# Patient Record
Sex: MALE | Race: BLACK OR AFRICAN AMERICAN | NOT HISPANIC OR LATINO | Employment: UNEMPLOYED | URBAN - METROPOLITAN AREA
[De-identification: names, ages, dates, MRNs, and addresses within clinical notes are randomized per-mention and may not be internally consistent; named-entity substitution may affect disease eponyms.]

---

## 2020-09-18 ENCOUNTER — OUTSIDE PLACE OF SERVICE (OUTPATIENT)
Dept: CARDIOLOGY | Facility: CLINIC | Age: 40
End: 2020-09-18
Payer: MEDICAID

## 2020-09-18 PROCEDURE — 93010 PR ELECTROCARDIOGRAM REPORT: ICD-10-PCS | Mod: ,,, | Performed by: INTERNAL MEDICINE

## 2020-09-18 PROCEDURE — 93010 ELECTROCARDIOGRAM REPORT: CPT | Mod: ,,, | Performed by: INTERNAL MEDICINE

## 2022-08-23 ENCOUNTER — HOSPITAL ENCOUNTER (EMERGENCY)
Facility: HOSPITAL | Age: 42
Discharge: HOME | End: 2022-08-23
Attending: EMERGENCY MEDICINE
Payer: MEDICAID

## 2022-08-23 VITALS
BODY MASS INDEX: 42.04 KG/M2 | DIASTOLIC BLOOD PRESSURE: 68 MMHG | OXYGEN SATURATION: 99 % | SYSTOLIC BLOOD PRESSURE: 124 MMHG | RESPIRATION RATE: 18 BRPM | WEIGHT: 310 LBS | TEMPERATURE: 98 F | HEART RATE: 65 BPM

## 2022-08-23 DIAGNOSIS — M54.9 BACK PAIN, UNSPECIFIED BACK LOCATION, UNSPECIFIED BACK PAIN LATERALITY, UNSPECIFIED CHRONICITY: Primary | ICD-10-CM

## 2022-08-23 DIAGNOSIS — K76.0 HEPATIC STEATOSIS: ICD-10-CM

## 2022-08-23 DIAGNOSIS — I86.1 BILATERAL VARICOCELES: ICD-10-CM

## 2022-08-23 DIAGNOSIS — N50.819 TESTICLE PAIN: ICD-10-CM

## 2022-08-23 DIAGNOSIS — R73.9 HYPERGLYCEMIA: ICD-10-CM

## 2022-08-23 DIAGNOSIS — N50.3 EPIDIDYMAL CYST: ICD-10-CM

## 2022-08-23 LAB
ALBUMIN SERPL BCP-MCNC: 4.5 G/DL (ref 3.5–5.2)
ALP SERPL-CCNC: 44 U/L (ref 55–135)
ALT SERPL W/O P-5'-P-CCNC: 46 U/L (ref 10–44)
ANION GAP SERPL CALC-SCNC: 9 MMOL/L (ref 8–16)
AST SERPL-CCNC: 25 U/L (ref 10–40)
BACTERIA #/AREA URNS AUTO: NORMAL /HPF
BASOPHILS # BLD AUTO: 0.02 K/UL (ref 0–0.2)
BASOPHILS NFR BLD: 0.3 % (ref 0–1.9)
BILIRUB SERPL-MCNC: 0.6 MG/DL (ref 0.1–1)
BILIRUB UR QL STRIP: NEGATIVE
BUN SERPL-MCNC: 9 MG/DL (ref 6–20)
CALCIUM SERPL-MCNC: 9.5 MG/DL (ref 8.7–10.5)
CHLORIDE SERPL-SCNC: 103 MMOL/L (ref 95–110)
CLARITY UR REFRACT.AUTO: CLEAR
CO2 SERPL-SCNC: 24 MMOL/L (ref 23–29)
COLOR UR AUTO: YELLOW
CREAT SERPL-MCNC: 0.8 MG/DL (ref 0.5–1.4)
DIFFERENTIAL METHOD: NORMAL
EOSINOPHIL # BLD AUTO: 0.1 K/UL (ref 0–0.5)
EOSINOPHIL NFR BLD: 1.2 % (ref 0–8)
ERYTHROCYTE [DISTWIDTH] IN BLOOD BY AUTOMATED COUNT: 12.6 % (ref 11.5–14.5)
EST. GFR  (NO RACE VARIABLE): >60 ML/MIN/1.73 M^2
ESTIMATED AVG GLUCOSE: 229 MG/DL (ref 68–131)
GLUCOSE SERPL-MCNC: 311 MG/DL (ref 70–110)
GLUCOSE UR QL STRIP: ABNORMAL
HBA1C MFR BLD: 9.6 % (ref 4–5.6)
HCT VFR BLD AUTO: 43.3 % (ref 40–54)
HGB BLD-MCNC: 14.9 G/DL (ref 14–18)
HGB UR QL STRIP: NEGATIVE
IMM GRANULOCYTES # BLD AUTO: 0.02 K/UL (ref 0–0.04)
IMM GRANULOCYTES NFR BLD AUTO: 0.3 % (ref 0–0.5)
KETONES UR QL STRIP: NEGATIVE
LEUKOCYTE ESTERASE UR QL STRIP: NEGATIVE
LIPASE SERPL-CCNC: 30 U/L (ref 4–60)
LYMPHOCYTES # BLD AUTO: 1.6 K/UL (ref 1–4.8)
LYMPHOCYTES NFR BLD: 28 % (ref 18–48)
MAGNESIUM SERPL-MCNC: 1.5 MG/DL (ref 1.6–2.6)
MCH RBC QN AUTO: 28.5 PG (ref 27–31)
MCHC RBC AUTO-ENTMCNC: 34.4 G/DL (ref 32–36)
MCV RBC AUTO: 83 FL (ref 82–98)
MICROSCOPIC COMMENT: NORMAL
MONOCYTES # BLD AUTO: 0.4 K/UL (ref 0.3–1)
MONOCYTES NFR BLD: 6.1 % (ref 4–15)
NEUTROPHILS # BLD AUTO: 3.7 K/UL (ref 1.8–7.7)
NEUTROPHILS NFR BLD: 64.1 % (ref 38–73)
NITRITE UR QL STRIP: NEGATIVE
NRBC BLD-RTO: 0 /100 WBC
PH UR STRIP: 7 [PH] (ref 5–8)
PLATELET # BLD AUTO: 208 K/UL (ref 150–450)
PMV BLD AUTO: 11 FL (ref 9.2–12.9)
POTASSIUM SERPL-SCNC: 4.2 MMOL/L (ref 3.5–5.1)
PROT SERPL-MCNC: 7.6 G/DL (ref 6–8.4)
PROT UR QL STRIP: ABNORMAL
RBC # BLD AUTO: 5.23 M/UL (ref 4.6–6.2)
RBC #/AREA URNS AUTO: 2 /HPF (ref 0–4)
SODIUM SERPL-SCNC: 136 MMOL/L (ref 136–145)
SP GR UR STRIP: 1.03 (ref 1–1.03)
URN SPEC COLLECT METH UR: ABNORMAL
WBC # BLD AUTO: 5.75 K/UL (ref 3.9–12.7)
WBC #/AREA URNS AUTO: 0 /HPF (ref 0–5)
YEAST UR QL AUTO: NORMAL

## 2022-08-23 PROCEDURE — 99284 PR EMERGENCY DEPT VISIT,LEVEL IV: ICD-10-PCS | Mod: ,,, | Performed by: PHYSICIAN ASSISTANT

## 2022-08-23 PROCEDURE — 83036 HEMOGLOBIN GLYCOSYLATED A1C: CPT | Performed by: PHYSICIAN ASSISTANT

## 2022-08-23 PROCEDURE — 25500020 PHARM REV CODE 255: Performed by: EMERGENCY MEDICINE

## 2022-08-23 PROCEDURE — 87491 CHLMYD TRACH DNA AMP PROBE: CPT | Performed by: PHYSICIAN ASSISTANT

## 2022-08-23 PROCEDURE — 99284 EMERGENCY DEPT VISIT MOD MDM: CPT | Mod: ,,, | Performed by: PHYSICIAN ASSISTANT

## 2022-08-23 PROCEDURE — 87389 HIV-1 AG W/HIV-1&-2 AB AG IA: CPT | Performed by: PHYSICIAN ASSISTANT

## 2022-08-23 PROCEDURE — 80053 COMPREHEN METABOLIC PANEL: CPT | Performed by: PHYSICIAN ASSISTANT

## 2022-08-23 PROCEDURE — 25000003 PHARM REV CODE 250: Performed by: EMERGENCY MEDICINE

## 2022-08-23 PROCEDURE — 63600175 PHARM REV CODE 636 W HCPCS: Performed by: PHYSICIAN ASSISTANT

## 2022-08-23 PROCEDURE — 87591 N.GONORRHOEAE DNA AMP PROB: CPT | Performed by: PHYSICIAN ASSISTANT

## 2022-08-23 PROCEDURE — 83690 ASSAY OF LIPASE: CPT | Performed by: PHYSICIAN ASSISTANT

## 2022-08-23 PROCEDURE — 81001 URINALYSIS AUTO W/SCOPE: CPT | Performed by: EMERGENCY MEDICINE

## 2022-08-23 PROCEDURE — 99285 EMERGENCY DEPT VISIT HI MDM: CPT | Mod: 25

## 2022-08-23 PROCEDURE — 86803 HEPATITIS C AB TEST: CPT | Performed by: PHYSICIAN ASSISTANT

## 2022-08-23 PROCEDURE — 83735 ASSAY OF MAGNESIUM: CPT | Performed by: PHYSICIAN ASSISTANT

## 2022-08-23 PROCEDURE — 85025 COMPLETE CBC W/AUTO DIFF WBC: CPT | Performed by: PHYSICIAN ASSISTANT

## 2022-08-23 PROCEDURE — 96374 THER/PROPH/DIAG INJ IV PUSH: CPT | Mod: 59

## 2022-08-23 RX ORDER — ACETAMINOPHEN 500 MG
1000 TABLET ORAL
Status: COMPLETED | OUTPATIENT
Start: 2022-08-23 | End: 2022-08-23

## 2022-08-23 RX ORDER — KETOROLAC TROMETHAMINE 30 MG/ML
10 INJECTION, SOLUTION INTRAMUSCULAR; INTRAVENOUS
Status: COMPLETED | OUTPATIENT
Start: 2022-08-23 | End: 2022-08-23

## 2022-08-23 RX ORDER — LIDOCAINE 50 MG/G
1 PATCH TOPICAL DAILY
Qty: 15 PATCH | Refills: 0 | Status: SHIPPED | OUTPATIENT
Start: 2022-08-23

## 2022-08-23 RX ADMIN — IOHEXOL 100 ML: 350 INJECTION, SOLUTION INTRAVENOUS at 01:08

## 2022-08-23 RX ADMIN — ACETAMINOPHEN 1000 MG: 500 TABLET ORAL at 01:08

## 2022-08-23 RX ADMIN — KETOROLAC TROMETHAMINE 10 MG: 30 INJECTION, SOLUTION INTRAMUSCULAR; INTRAVENOUS at 01:08

## 2022-08-23 NOTE — FIRST PROVIDER EVALUATION
Medical screening exam completed.  I have conducted a focused provider triage encounter, findings are as follows:    Brief history of present illness:  Patient complaining of back, lower abdominal and right groin pain radiating into his back for 2 years, no acute change today, and denies any urinary symptoms, no fevers or chills    Vitals:    08/23/22 1136   BP: (!) 144/93   BP Location: Right arm   Patient Position: Sitting   Pulse: 87   Resp: 18   Temp: 97.6 °F (36.4 °C)   TempSrc: Oral   SpO2: 96%   Weight: (!) 140.6 kg (310 lb)       Pertinent physical exam:  Vital signs stable and patient well-appearing clinically    Brief workup plan:  UA and tylenol ordered    Preliminary workup initiated; this workup will be continued and followed by the physician or advanced practice provider that is assigned to the patient when roomed.

## 2022-08-23 NOTE — ED TRIAGE NOTES
Pt reports bilateral groin pain x 3 years intermittent in nature that radiates to the back/side. Hx of back pain. Denies urinary symptoms.

## 2022-08-23 NOTE — ED PROVIDER NOTES
Encounter Date: 8/23/2022       History     Chief Complaint   Patient presents with    Groin Pain     X3 years. Reports hasn't been coming to the hospital because hes scared of Monkey pox. Denies urinary symptoms.      The history is provided by the patient and medical records. No  was used.      Angelia Rico is a 41 y.o. male with medical history of obesity, DM, L4-5 disc herniation presenting to the ED with the chief complaint of groin pain.    Patient reports several years of BL flank pain that radiates down to his groin and testicles. Pain worsens with standing up. His groin pain is located along his belt line. Denies recent falls or trauma. Denies scrotal swelling, rash, STD concerns. Reports only being able to get an erection whenever he lays on his stomach or when he lays in a hot bath. Reports being seen in the ED and his old PCP and had CT scans and U/S performed and no one has told him what was wrong. Denies history of hernia, nephrolithiasis, history of abdominal surgeries. Denies penile discharge or concerns for STD. He is sexually active with 1 female partner. No fever, chest pain, SOB, abdominal pain, vomiting, diarrhea, constipation, b/b dysfunction. Last BM was yesterday and normal. 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     Review of patient's allergies indicates:  No Known Allergies  History reviewed. No pertinent past medical history.  Past Surgical History:   Procedure Laterality Date    KNEE SURGERY Right     ACL     History reviewed. No pertinent family history.  Social History     Tobacco Use    Smoking status: Never Smoker   Substance Use Topics    Alcohol use: Yes     Comment: on occassion    Drug use: No     Review of Systems   Constitutional: Negative for fever.   HENT: Negative for sore throat.    Eyes: Negative for pain.   Respiratory: Negative for shortness of breath.    Cardiovascular: Negative for chest pain.    Gastrointestinal: Negative for nausea.   Genitourinary: Positive for flank pain and testicular pain. Negative for dysuria.   Musculoskeletal: Negative for back pain.   Skin: Negative for rash.   Neurological: Negative for weakness.   Hematological: Does not bruise/bleed easily.       Physical Exam     Initial Vitals [08/23/22 1136]   BP Pulse Resp Temp SpO2   (!) 144/93 87 18 97.6 °F (36.4 °C) 96 %      MAP       --         Physical Exam    Constitutional: He appears well-developed and well-nourished. He is not diaphoretic. He is easily aroused.   HENT:   Head: Normocephalic and atraumatic.   Mouth/Throat: Oropharynx is clear and moist. No oropharyngeal exudate.   Eyes: EOM and lids are normal. Pupils are equal, round, and reactive to light. No scleral icterus.   Neck: Phonation normal. Neck supple. No stridor present.   Normal range of motion.  Cardiovascular: Normal rate and regular rhythm.   Pulmonary/Chest: Breath sounds normal. No stridor. No respiratory distress. He has no wheezes. He has no rales.   Abdominal: Abdomen is soft. He exhibits no distension. There is abdominal tenderness (RLQ, LLQ). Hernia confirmed negative in the right inguinal area and confirmed negative in the left inguinal area. There is no rebound.   Genitourinary:    Penis normal.   Left testis shows swelling and tenderness. Circumcised.   Musculoskeletal:         General: No tenderness or edema. Normal range of motion.      Cervical back: Normal range of motion and neck supple.      Comments: No midline spinal tenderness  Full A/P ROM of extremities     Neurological: He is alert, oriented to person, place, and time and easily aroused. He has normal strength. No sensory deficit.   No focal strength or sensory deficit to lower extremities. No saddle anesthesia.   Skin: Skin is warm and dry. No rash noted. No erythema.   Psychiatric: He has a normal mood and affect. His speech is normal.         ED Course   Procedures  Labs Reviewed    URINALYSIS, REFLEX TO URINE CULTURE - Abnormal; Notable for the following components:       Result Value    Protein, UA Trace (*)     Glucose, UA 4+ (*)     All other components within normal limits    Narrative:     Specimen Source->Urine   COMPREHENSIVE METABOLIC PANEL - Abnormal; Notable for the following components:    Glucose 311 (*)     Alkaline Phosphatase 44 (*)     ALT 46 (*)     All other components within normal limits   MAGNESIUM - Abnormal; Notable for the following components:    Magnesium 1.5 (*)     All other components within normal limits   HEMOGLOBIN A1C - Abnormal; Notable for the following components:    Hemoglobin A1C 9.6 (*)     Estimated Avg Glucose 229 (*)     All other components within normal limits    Narrative:     ADD ON [facility] #[id number] PER SAMINA MUÑOZ PA-C 08/23/2022  15:27    C. TRACHOMATIS/N. GONORRHOEAE BY AMP DNA   URINALYSIS MICROSCOPIC    Narrative:     Specimen Source->Urine   CBC W/ AUTO DIFFERENTIAL   LIPASE   HEMOGLOBIN A1C   HIV 1 / 2 ANTIBODY   HEPATITIS C ANTIBODY          Imaging Results          US Scrotum And Testicles (Final result)  Result time 08/23/22 14:22:30    Final result by Rosales Nichols MD (08/23/22 14:22:30)                 Impression:      1. No acute sonographic abnormality.  2. Bilateral varicoceles, left more prominent than right.  3. Tiny left epididymal head cyst.    Electronically signed by resident: Bhupendra Monzon  Date:    08/23/2022  Time:    14:14    Electronically signed by: Rosales Nichols MD  Date:    08/23/2022  Time:    14:22             Narrative:    EXAMINATION:  US SCROTUM AND TESTICLES    CLINICAL HISTORY:  Testicular pain, unspecified    TECHNIQUE:  Sonography of the scrotum and testes.    COMPARISON:  CT abdomen pelvis 10/26/2016.    FINDINGS:  The right testicle measures 5.1 x 2.5 x 3.1 cm, appears homogeneous, and demonstrates intact vascular flow.  The right testicle is unremarkable.  The right epididymis is  unremarkable.    Right-sided varicocele.  No evidence of a right hydrocele.    The left testicle measures 4.7 x 2.4 x 3.0 cm, appears homogeneous, and demonstrates intact vascular flow. The left testicle is unremarkable. The left epididymal head has an anechoic focus measuring 0.3 x 0.2 x 0.2 cm compatible with a cyst.  The left epididymis is otherwise unremarkable.    Left-sided varicocele.  No evidence of a left hydrocele.                               CT Abdomen Pelvis With Contrast (Final result)  Result time 08/23/22 14:47:11    Final result by Franc Giordano MD (08/23/22 14:47:11)                 Impression:      No acute process or CT findings identified to explain patient's symptoms of nonlocalized abdominal pain.  Specifically, no evidence of bowel obstruction.    Hepatomegaly and suspected hepatic steatosis.    Few additional findings as above.      Electronically signed by: Franc Giordano MD  Date:    08/23/2022  Time:    14:47             Narrative:    EXAMINATION:  CT ABDOMEN PELVIS WITH CONTRAST    CLINICAL HISTORY:  Bowel obstruction suspected;Abdominal pain, acute, nonlocalized;    TECHNIQUE:  Low dose axial images, sagittal and coronal reformations were obtained from the lung bases to the pubic symphysis following the IV administration of 100 mL of Omnipaque 350 .  Oral contrast was not given.    COMPARISON:  Scrotal ultrasound same day, CT abdomen and pelvis 10/26/2016    FINDINGS:  Study is somewhat limited by respiratory motion artifact.    Imaged lung bases are grossly clear allowing for respiratory motion.  Base of the heart is within normal limits.    Liver measures approximately 21 cm in maximum coronal length with diffuse parenchymal hypoattenuation and some sparing near the gallbladder suggesting fatty infiltration.  No discrete hepatic lesion seen.  Main portal vein appears patent.    Gallbladder, pancreas, spleen, stomach, duodenum and bilateral adrenal glands are within normal limits.  No  biliary ductal dilatation.    Bilateral kidneys are normal in size, shape and location with relatively symmetric normal enhancement.  No hydronephrosis or significant perinephric stranding.  Ureters are nondilated.  Urinary bladder is well distended without wall thickening.  Prostate and seminal vesicles are within normal limits.  Pelvic phleboliths noted.    Appendix and terminal ileum are within normal limits.  No evidence of bowel obstruction or acute inflammation.  No pneumatosis or portal venous gas.    No ascites, free air or lymphadenopathy.  No significant atherosclerosis.  No aortic aneurysm or dissection.    Small fat containing umbilical hernia.    Osseous structures show moderate to advanced degenerative disc disease now with vacuum phenomenon at L4-5 level, progressed since 2016.  Minimal degenerative change elsewhere.  No acute displaced fracture-dislocation.                                 Medications   acetaminophen tablet 1,000 mg (1,000 mg Oral Given 8/23/22 1301)   ketorolac injection 9.999 mg (9.999 mg Intravenous Given 8/23/22 1301)   iohexoL (OMNIPAQUE 350) injection 100 mL (100 mLs Intravenous Given 8/23/22 1342)     Medical Decision Making:   History:   Old Medical Records: I decided to obtain old medical records.  Old Records Summarized: records from clinic visits.  Clinical Tests:   Lab Tests: Ordered and Reviewed  Radiological Study: Ordered and Reviewed       APC / Resident Notes:   41 y.o. male with medical history of obesity, DM, L4-5 disc herniation presenting to the ED c/o acute on chronic BL flank pain that radiates down to his groin and testicles. DDx includes but not limited to lumbar radiculopathy, nephrolithiasis, pyelonephritis, hernia, epididymitis, orchiditis, hydrocele, meralgia paresthetica.    Work-up reviewed. Glucose elevated 311. No AG or ketonuria and CO2 normal and do not suspect DKA. UA negative for UTI. U/S showing BL varicoceles and L epididymal head cyst. CT  abd/pelv without significant findings. Hepatitic steatosis seen. Discussed possibility of prior disc herniation causing radiculopathy pain. Pain is chronic and denies new location. Neurovascularly intact. No b/b/ dysfunction and do not suspect cauda equina. Advised patient to follow-up with his PCP for ongoing management. Discussed DM diet recommendations and stressed Metformin compliance. Patient expresses understanding and agreeable to the plan. Return to ED precautions given for new, worsening, or concerning symptoms.         ED Course as of 08/23/22 2158 Tue Aug 23, 2022   1252 Glucose, UA(!): 4+ [BA]      ED Course User Index  [BA] Franc Powell PA-C          Clinical Impression:   Final diagnoses:  [N50.819] Testicle pain  [R73.9] Hyperglycemia  [I86.1] Bilateral varicoceles  [N50.3] Epididymal cyst  [K76.0] Hepatic steatosis  [M54.9] Back pain, unspecified back location, unspecified back pain laterality, unspecified chronicity (Primary)          ED Disposition Condition    Discharge Stable        ED Prescriptions     Medication Sig Dispense Start Date End Date Auth. Provider    LIDOcaine (LIDODERM) 5 % Place 1 patch onto the skin once daily. Remove & Discard patch within 12 hours or as directed by MD 15 patch 8/23/2022  Franc Powell PA-C        Follow-up Information     Follow up With Specialties Details Why Contact Info Additional Information    [facility]   [address]  [facility] [id number] [phone] [facility] Please park in surface lot and check in at central registration desk    [facility]    [address] [id number]     [facility]    [address]  [place] [address]  [phone]              Franc Powell PA-C  08/23/22 2158

## 2022-08-23 NOTE — DISCHARGE INSTRUCTIONS
Follow-up with a primary care provider for further evaluation. You may use the below contact information to obtain an appointment.    Take Tylenol and Ibuprofen for your back pain. You may also use the prescribed Lidoderm patches for pain.    Return to the emergency room for new, worsening, or concerning symptoms.

## 2022-08-24 LAB
C TRACH DNA SPEC QL NAA+PROBE: NOT DETECTED
HCV AB SERPL QL IA: NEGATIVE
HIV 1+2 AB+HIV1 P24 AG SERPL QL IA: NEGATIVE
N GONORRHOEA DNA SPEC QL NAA+PROBE: NOT DETECTED